# Patient Record
Sex: FEMALE | Race: WHITE | URBAN - METROPOLITAN AREA
[De-identification: names, ages, dates, MRNs, and addresses within clinical notes are randomized per-mention and may not be internally consistent; named-entity substitution may affect disease eponyms.]

---

## 2022-09-28 ENCOUNTER — OFFICE VISIT (OUTPATIENT)
Dept: FAMILY MEDICINE CLINIC | Facility: CLINIC | Age: 22
End: 2022-09-28

## 2022-09-28 VITALS
SYSTOLIC BLOOD PRESSURE: 118 MMHG | HEIGHT: 66 IN | WEIGHT: 151 LBS | BODY MASS INDEX: 24.27 KG/M2 | HEART RATE: 71 BPM | DIASTOLIC BLOOD PRESSURE: 70 MMHG

## 2022-09-28 DIAGNOSIS — Z00.00 WELLNESS EXAMINATION: Primary | ICD-10-CM

## 2022-09-28 DIAGNOSIS — F32.1 MODERATE MAJOR DEPRESSION (HCC): ICD-10-CM

## 2022-09-28 DIAGNOSIS — Z11.3 ROUTINE SCREENING FOR STI (SEXUALLY TRANSMITTED INFECTION): ICD-10-CM

## 2022-09-28 DIAGNOSIS — Z13.220 LIPID SCREENING: ICD-10-CM

## 2022-09-28 DIAGNOSIS — Z11.59 ENCOUNTER FOR HEPATITIS C SCREENING TEST FOR LOW RISK PATIENT: ICD-10-CM

## 2022-09-28 DIAGNOSIS — Z13.1 DIABETES MELLITUS SCREENING: ICD-10-CM

## 2022-09-28 PROCEDURE — 3008F BODY MASS INDEX DOCD: CPT | Performed by: FAMILY MEDICINE

## 2022-09-28 PROCEDURE — 3074F SYST BP LT 130 MM HG: CPT | Performed by: FAMILY MEDICINE

## 2022-09-28 PROCEDURE — 99385 PREV VISIT NEW AGE 18-39: CPT | Performed by: FAMILY MEDICINE

## 2022-09-28 PROCEDURE — 99213 OFFICE O/P EST LOW 20 MIN: CPT | Performed by: FAMILY MEDICINE

## 2022-09-28 PROCEDURE — 3078F DIAST BP <80 MM HG: CPT | Performed by: FAMILY MEDICINE

## 2022-10-05 ENCOUNTER — LAB ENCOUNTER (OUTPATIENT)
Dept: LAB | Age: 22
End: 2022-10-05
Attending: FAMILY MEDICINE
Payer: COMMERCIAL

## 2022-10-05 DIAGNOSIS — Z13.220 LIPID SCREENING: ICD-10-CM

## 2022-10-05 DIAGNOSIS — Z13.1 DIABETES MELLITUS SCREENING: ICD-10-CM

## 2022-10-05 DIAGNOSIS — Z11.3 ROUTINE SCREENING FOR STI (SEXUALLY TRANSMITTED INFECTION): ICD-10-CM

## 2022-10-05 DIAGNOSIS — Z11.59 ENCOUNTER FOR HEPATITIS C SCREENING TEST FOR LOW RISK PATIENT: ICD-10-CM

## 2022-10-05 LAB
CHOLEST SERPL-MCNC: 190 MG/DL (ref ?–200)
EST. AVERAGE GLUCOSE BLD GHB EST-MCNC: 85 MG/DL (ref 68–126)
FASTING PATIENT LIPID ANSWER: YES
HBA1C MFR BLD: 4.6 % (ref ?–5.7)
HBV SURFACE AG SER-ACNC: 0.15 [IU]/L
HBV SURFACE AG SERPL QL IA: NONREACTIVE
HCV AB SERPL QL IA: NONREACTIVE
HDLC SERPL-MCNC: 85 MG/DL (ref 40–59)
LDLC SERPL CALC-MCNC: 92 MG/DL (ref ?–100)
NONHDLC SERPL-MCNC: 105 MG/DL (ref ?–130)
TRIGL SERPL-MCNC: 70 MG/DL (ref 30–149)
VLDLC SERPL CALC-MCNC: 11 MG/DL (ref 0–30)

## 2022-10-05 PROCEDURE — 87340 HEPATITIS B SURFACE AG IA: CPT

## 2022-10-05 PROCEDURE — 86780 TREPONEMA PALLIDUM: CPT

## 2022-10-05 PROCEDURE — 80061 LIPID PANEL: CPT

## 2022-10-05 PROCEDURE — 87389 HIV-1 AG W/HIV-1&-2 AB AG IA: CPT

## 2022-10-05 PROCEDURE — 86803 HEPATITIS C AB TEST: CPT

## 2022-10-05 PROCEDURE — 36415 COLL VENOUS BLD VENIPUNCTURE: CPT

## 2022-10-05 PROCEDURE — 83036 HEMOGLOBIN GLYCOSYLATED A1C: CPT

## 2022-10-07 LAB — T PALLIDUM AB SER QL: NEGATIVE

## 2023-12-06 ENCOUNTER — HOSPITAL ENCOUNTER (OUTPATIENT)
Age: 23
Discharge: HOME | End: 2023-12-06
Payer: COMMERCIAL

## 2023-12-06 VITALS
RESPIRATION RATE: 16 BRPM | SYSTOLIC BLOOD PRESSURE: 114 MMHG | OXYGEN SATURATION: 100 % | HEART RATE: 95 BPM | DIASTOLIC BLOOD PRESSURE: 80 MMHG | TEMPERATURE: 98 F

## 2023-12-06 DIAGNOSIS — Z20.822 ENCOUNTER FOR LABORATORY TESTING FOR COVID-19 VIRUS: Primary | ICD-10-CM

## 2023-12-06 DIAGNOSIS — B34.9 VIRAL ILLNESS: ICD-10-CM

## 2023-12-06 LAB
S PYO AG THROAT QL: NEGATIVE
SARS-COV-2 RNA RESP QL NAA+PROBE: NOT DETECTED

## 2023-12-06 PROCEDURE — 87880 STREP A ASSAY W/OPTIC: CPT | Performed by: PHYSICIAN ASSISTANT

## 2023-12-06 PROCEDURE — 99203 OFFICE O/P NEW LOW 30 MIN: CPT | Performed by: PHYSICIAN ASSISTANT

## 2023-12-06 PROCEDURE — U0002 COVID-19 LAB TEST NON-CDC: HCPCS | Performed by: PHYSICIAN ASSISTANT

## 2023-12-07 ENCOUNTER — HOSPITAL ENCOUNTER (OUTPATIENT)
Age: 23
Discharge: HOME | End: 2023-12-07
Payer: COMMERCIAL

## 2023-12-07 VITALS
DIASTOLIC BLOOD PRESSURE: 80 MMHG | WEIGHT: 165 LBS | TEMPERATURE: 99 F | HEART RATE: 95 BPM | HEIGHT: 66 IN | RESPIRATION RATE: 20 BRPM | BODY MASS INDEX: 26.52 KG/M2 | SYSTOLIC BLOOD PRESSURE: 120 MMHG | OXYGEN SATURATION: 99 %

## 2023-12-07 DIAGNOSIS — R05.9 COUGH: Primary | ICD-10-CM

## 2023-12-07 DIAGNOSIS — J10.1 INFLUENZA A: ICD-10-CM

## 2023-12-07 LAB
POCT INFLUENZA A: POSITIVE
POCT INFLUENZA B: NEGATIVE

## 2023-12-07 RX ORDER — IBUPROFEN 600 MG/1
600 TABLET ORAL EVERY 8 HOURS PRN
Qty: 15 TABLET | Refills: 0 | Status: SHIPPED | OUTPATIENT
Start: 2023-12-07 | End: 2023-12-12

## 2023-12-07 RX ORDER — ACETAMINOPHEN 500 MG
1000 TABLET ORAL EVERY 6 HOURS PRN
Qty: 40 TABLET | Refills: 0 | Status: SHIPPED | OUTPATIENT
Start: 2023-12-07 | End: 2023-12-12

## 2024-11-15 ENCOUNTER — HOSPITAL ENCOUNTER (OUTPATIENT)
Age: 24
Discharge: HOME | End: 2024-11-15
Payer: COMMERCIAL

## 2024-11-15 VITALS
SYSTOLIC BLOOD PRESSURE: 108 MMHG | DIASTOLIC BLOOD PRESSURE: 68 MMHG | OXYGEN SATURATION: 99 % | RESPIRATION RATE: 18 BRPM | HEART RATE: 84 BPM | TEMPERATURE: 97 F

## 2024-11-15 DIAGNOSIS — J02.9 VIRAL PHARYNGITIS: Primary | ICD-10-CM

## 2024-11-15 LAB — S PYO AG THROAT QL: NEGATIVE

## 2024-11-15 PROCEDURE — 99213 OFFICE O/P EST LOW 20 MIN: CPT | Performed by: PHYSICIAN ASSISTANT

## 2024-11-15 PROCEDURE — 87880 STREP A ASSAY W/OPTIC: CPT | Performed by: PHYSICIAN ASSISTANT

## 2024-11-15 NOTE — ED INITIAL ASSESSMENT (HPI)
Pain with swallowing and cough for a couple of days with no fever.  Negative covid test yesterday.

## 2024-11-15 NOTE — ED PROVIDER NOTES
Patient Seen in: [facility]      History     Chief Complaint   Patient presents with    Sore Throat     Stated Complaint: sore throat, congestion,    Subjective:   HPI      Patient is a 23-year-old female that presents to immediate care due to sore throat x 2 days.  Associate symptoms include sinus congestion rhinorrhea.  Denies cough fever.  Has been taking Sudafed, DayQuil NyQuil at home with mild relief.  Patient had rapid negative COVID-19 test at home prior to arrival.    Objective:     History reviewed. No pertinent past medical history.           History reviewed. No pertinent surgical history.             Social History     Socioeconomic History    Marital status: Single   Tobacco Use    Smoking status: Never    Smokeless tobacco: Never   Vaping Use    Vaping status: Never Used   Substance and Sexual Activity    Alcohol use: Yes     Alcohol/week: 6.0 standard drinks of alcohol     Types: 6 Shots of liquor per week     Comment: daily for the last 2months    Drug use: Yes     Types: Cannabis    Sexual activity: Yes     Partners: Female              Review of Systems    Positive for stated complaint: sore throat, congestion,  Other systems are as noted in HPI.  Constitutional and vital signs reviewed.      All other systems reviewed and negative except as noted above.    Physical Exam     ED Triage Vitals [11/15/24 1408]   /68   Pulse 84   Resp 18   Temp 97.3 °F (36.3 °C)   Temp src Temporal   SpO2 99 %   O2 Device None (Room air)       Current Vitals:   Vital Signs  BP: 108/68  Pulse: 84  Resp: 18  Temp: 97.3 °F (36.3 °C)  Temp src: Temporal    Oxygen Therapy  SpO2: 99 %  O2 Device: None (Room air)        Physical Exam  Vital signs reviewed. Nursing note reviewed.  Constitutional: Well-developed. Well-nourished. In no acute distress  HENT: Mucous membranes moist. TMs intact bilaterally. No trismus. Uvula midline. Mild posterior pharynx erythema.  No petechiae, exudates, or posterior pharynx  edema.  EYES: No scleral icterus or conjunctival injection.  NECK: Full ROM. Supple.   CARDIAC: Normal rate. Normal S1/ S2. 2+ distal pulses. No edema  PULM/CHEST: Clear to auscultation bilaterally. No wheezes  Extremities: Full ROM  NEURO: Awake, alert, following commands, moving extremities, answering questions.   SKIN: Warm and dry. No rash or lesions.  PSYCH: Normal judgment. Normal affect.        ED Course     Labs Reviewed   POCT RAPID STREP - Normal                   MDM      Patient is a healthy vaccinated 23-year-old female that presents to immediate care due to sore throat x 2 days.  Patient arrives with stable vitals sitting comfortably.  Physical exam showing mild posterior pharynx erythema.  Most likely viral pharyngitis.  Less likely strep pharyngitis as patient had rapid negative test today in immediate care.  Unlikely PTA or retropharyngeal abscess.  No clinical evidence of sepsis or SBI. discussed supportive treatment including antihistamines such as Claritin or Zyrtec.  Take Tylenol or ibuprofen as needed for pain.  Return precautions including new or worsening symptoms including worsening pain difficulty swallowing drooling.  History given by patient.          Medical Decision Making      Disposition and Plan     Clinical Impression:  1. Viral pharyngitis         Disposition:  Discharge  11/15/2024  2:31 pm    Follow-up:  Nara Pimentel MD  [address] [place] [id number] [phone]    Call             Medications Prescribed:  There are no discharge medications for this patient.          Supplementary Documentation:

## 2024-11-16 ENCOUNTER — E-VISIT (OUTPATIENT)
Dept: TELEHEALTH | Age: 24
End: 2024-11-16
Payer: COMMERCIAL

## 2024-11-16 DIAGNOSIS — H10.9 BACTERIAL CONJUNCTIVITIS: Primary | ICD-10-CM

## 2024-11-16 PROCEDURE — 99422 OL DIG E/M SVC 11-20 MIN: CPT | Performed by: PHYSICIAN ASSISTANT

## 2024-11-16 RX ORDER — TOBRAMYCIN 3 MG/ML
2 SOLUTION/ DROPS OPHTHALMIC EVERY 6 HOURS
Qty: 5 ML | Refills: 0 | Status: SHIPPED | OUTPATIENT
Start: 2024-11-16 | End: 2024-11-23

## 2024-11-16 NOTE — PATIENT INSTRUCTIONS
-May call [phone] with questions or concerns.  -The ER is advised with worsening symptoms.  -Please refer to [employer] messages for further care instructions.

## 2024-11-16 NOTE — PROGRESS NOTES
HPI:  Ty Styles is a 23 year old female who presents for an evisit.  See [employer] above.    Current Outpatient Medications   Medication Sig Dispense Refill    tobramycin 0.3 % Ophthalmic Solution Place 2 drops into the right eye every 6 (six) hours for 7 days. 5 mL 0     No past medical history on file.  No past surgical history on file.    Social History     Socioeconomic History    Marital status: Single   Tobacco Use    Smoking status: Never    Smokeless tobacco: Never   Vaping Use    Vaping status: Never Used   Substance and Sexual Activity    Alcohol use: Yes     Alcohol/week: 6.0 standard drinks of alcohol     Types: 6 Shots of liquor per week     Comment: daily for the last 2months    Drug use: Yes     Types: Cannabis    Sexual activity: Yes     Partners: Female          No results found for this or any previous visit (from the past 24 hours).    ASSESSMENT AND PLAN:      ASSESSMENT:   Encounter Diagnosis   Name Primary?    Bacterial conjunctivitis Yes       PLAN: Meds as below.  See patient Instructions  -Total of 13 minutes spent with patient.    Meds & Refills for this Visit:  Requested Prescriptions     Signed Prescriptions Disp Refills    tobramycin 0.3 % Ophthalmic Solution 5 mL 0     Sig: Place 2 drops into the right eye every 6 (six) hours for 7 days.       Risks, benefits, and side effects of medication explained and discussed.    Patient Instructions   -May call [phone] with questions or concerns.  -The ER is advised with worsening symptoms.  -Please refer to [employer] messages for further care instructions.       The patient indicates understanding of these issues and agrees to the plan.  See attached patient references.  The patient is asked to return if sx's persist or worsen.    Ty Styles understands evisit evaluation is not a substitute for face-to-face examination or emergency care. Patient advised to go to ER or call 911 for worsening symptoms or acute  distress.

## 2025-04-17 ENCOUNTER — OFFICE VISIT (OUTPATIENT)
Dept: FAMILY MEDICINE CLINIC | Facility: CLINIC | Age: 25
End: 2025-04-17
Payer: COMMERCIAL

## 2025-04-17 VITALS
TEMPERATURE: 99 F | OXYGEN SATURATION: 98 % | RESPIRATION RATE: 16 BRPM | DIASTOLIC BLOOD PRESSURE: 76 MMHG | HEIGHT: 66 IN | HEART RATE: 80 BPM | SYSTOLIC BLOOD PRESSURE: 114 MMHG | BODY MASS INDEX: 28.28 KG/M2 | WEIGHT: 176 LBS

## 2025-04-17 DIAGNOSIS — Z11.3 SCREENING EXAMINATION FOR STI: ICD-10-CM

## 2025-04-17 DIAGNOSIS — N89.8 VAGINAL ITCHING: Primary | ICD-10-CM

## 2025-04-17 PROCEDURE — 87591 N.GONORRHOEAE DNA AMP PROB: CPT | Performed by: NURSE PRACTITIONER

## 2025-04-17 PROCEDURE — 87491 CHLMYD TRACH DNA AMP PROBE: CPT | Performed by: NURSE PRACTITIONER

## 2025-04-17 PROCEDURE — 99213 OFFICE O/P EST LOW 20 MIN: CPT | Performed by: NURSE PRACTITIONER

## 2025-04-17 PROCEDURE — 81514 NFCT DS BV&VAGINITIS DNA ALG: CPT | Performed by: NURSE PRACTITIONER

## 2025-04-17 RX ORDER — FLUCONAZOLE 150 MG/1
TABLET ORAL
Qty: 2 TABLET | Refills: 0 | Status: SHIPPED | OUTPATIENT
Start: 2025-04-17

## 2025-04-18 LAB
BV BACTERIA DNA VAG QL NAA+PROBE: NEGATIVE
C GLABRATA DNA VAG QL NAA+PROBE: NEGATIVE
C KRUSEI DNA VAG QL NAA+PROBE: NEGATIVE
C TRACH DNA SPEC QL NAA+PROBE: NEGATIVE
CANDIDA DNA VAG QL NAA+PROBE: POSITIVE
N GONORRHOEA DNA SPEC QL NAA+PROBE: NEGATIVE
T VAGINALIS DNA VAG QL NAA+PROBE: NEGATIVE

## 2025-04-18 NOTE — PROGRESS NOTES
CHIEF COMPLAINT:     Chief Complaint   Patient presents with    Urinary Symptoms     Tuesday - Charlo with a new partner (wants STI screen)  Sx Wednesday - Vaginal dryness, itching  Denies dysuria, frequency, urgency, lower abdominal pressure, lower back pain, hematuria, cloudy/dark urine, vaginal discharge, burning, rash, fever, chills, n/v  No OTC       HPI:   Ty Styles is a 24 year old female who presents with symptoms of vaginal complaint.  Reports had intercourse 2 days ago.  Then yesterday, started with vaginal itching and feels vaginal dryness.  Has not noted any discharge.  Denies dysuria, frequency, urgency, abdominal pain, flank pain, hematuria, vaginal burning, vaginal rash or lesion, fever, chills, or N/V.  Has new partner, would like STI screening.  Not taking anything otc.  Denies recurrent vaginal infections or UTI.  Reports paps UTD.    Current Medications[1]   Past Medical History[2]   Social History:  Short Social Hx on File[3]      REVIEW OF SYSTEMS:   GENERAL: Denies fever, chills, or body aches  SKIN: no rashes, no skin wounds or ulcers.  EYES:denies blurred vision or double vision  HEENT: no congestion, rhinorrhea, sore throat or ear pain  CARDIOVASCULAR: denies chest pain or palpitations  LUNGS: denies shortness of breath, cough, or wheezing  GI: See HPI. No N/V/C/D.   : See HPI.  NEURO: no headaches.    EXAM:   /76   Pulse 80   Temp 98.8 °F (37.1 °C) (Tympanic)   Resp 16   Ht 5' 6\" (1.676 m)   Wt 176 lb (79.8 kg)   LMP 03/30/2025 (Exact Date)   SpO2 98%   BMI 28.41 kg/m²   GENERAL: well developed, well nourished, and in no apparent distress  CARDIO: RRR, no murmurs  LUNGS: clear to ausculation bilaterally, no wheezing or rhonchi  GI: BS present x 4 and normoactive.  No hepatosplenomegaly.  : No suprapubic tenderness, No bladder distention or CVAT.  Pelvic Exam:  External Genitalia: Normal hair distribution and no lesions.  +Mild inflammation/erythema to  labia.  Vagina:  +Mild inflammation/erythema, +scant clumpy white discharge.      ASSESSMENT AND PLAN:   Ty Styles is a 24 year old female presents with UTI symptoms.    ASSESSMENT:  Encounter Diagnoses   Name Primary?    Vaginal itching Yes    Screening examination for STI        PLAN:   - Vaginal culture obtained.  - GC/chlamydia testing obtained.  - Will cover empirically for yeast for now given exam.  - Comfort measures as described in Patient Instructions.    - Advised F/U visit if no improvement/worsening within 3-5 days.    - Pt verbalizes understanding and is agreeable w/ plan.    Meds & Refills for this Visit:  Requested Prescriptions     Signed Prescriptions Disp Refills    fluconazole (DIFLUCAN) 150 MG Oral Tab 2 tablet 0     Sig: Take 1 tablet PO for one dose, may repeat in 3 days if needed       Risk and benefits of medication discussed. Stressed importance of completing full course of antibiotic. Pt verbalizes understanding.    There are no Patient Instructions on file for this visit.                    [1]   Current Outpatient Medications   Medication Sig Dispense Refill    fluconazole (DIFLUCAN) 150 MG Oral Tab Take 1 tablet PO for one dose, may repeat in 3 days if needed 2 tablet 0   [2] History reviewed. No pertinent past medical history.  [3]   Social History  Socioeconomic History    Marital status: Single   Tobacco Use    Smoking status: Never    Smokeless tobacco: Never   Vaping Use    Vaping status: Never Used   Substance and Sexual Activity    Alcohol use: Yes     Alcohol/week: 6.0 standard drinks of alcohol     Types: 6 Shots of liquor per week     Comment: daily for the last 2months    Drug use: Yes     Types: Cannabis    Sexual activity: Yes     Partners: Female

## (undated) NOTE — LETTER
Date & Time: 12/7/2023, 11:55 AM  Patient: Janet Chadwick  Encounter Provider(s):    Leatha Albright PA-C       To Whom It May Concern:    Janet Chadwick was seen and treated in our department on 12/7/2023. She can return to work 12/11/2023.     If you have any questions or concerns, please do not hesitate to call.        _____________________________  Physician/AP[facility] Signature